# Patient Record
Sex: MALE | Race: WHITE | URBAN - METROPOLITAN AREA
[De-identification: names, ages, dates, MRNs, and addresses within clinical notes are randomized per-mention and may not be internally consistent; named-entity substitution may affect disease eponyms.]

---

## 2017-05-15 ENCOUNTER — THERAPY VISIT (OUTPATIENT)
Dept: PHYSICAL THERAPY | Facility: CLINIC | Age: 77
End: 2017-05-15

## 2017-05-15 DIAGNOSIS — M25.551 RIGHT HIP PAIN: ICD-10-CM

## 2017-05-15 DIAGNOSIS — M25.561 RIGHT KNEE PAIN: ICD-10-CM

## 2017-05-15 DIAGNOSIS — M54.50 LUMBAGO: Primary | ICD-10-CM

## 2017-05-15 PROCEDURE — 97161 PT EVAL LOW COMPLEX 20 MIN: CPT | Mod: GP | Performed by: PHYSICAL THERAPIST

## 2017-05-15 PROCEDURE — 97110 THERAPEUTIC EXERCISES: CPT | Mod: GP | Performed by: PHYSICAL THERAPIST

## 2017-05-15 NOTE — MR AVS SNAPSHOT
"              After Visit Summary   5/15/2017    Dawson Oshea    MRN: [id number]           Patient Information     Date Of Birth          1940        Visit Information        Provider Department      5/15/2017 8:00 AM Ricci Donaldson, [facility] Athletic [facility] Physical [facility]        Today's Diagnoses     Lumbago    -  1    Right hip pain        Right knee pain           Follow-ups after your visit        Your next 10 appointments already scheduled     May 19, 2017 11:00 AM CDT   [facility] Spine with Ricci Donaldson PT   [facility] ([place]  )    [address]  [place] [address] [phone]              Who to contact     If you have questions or need follow up information about today's clinic visit or your schedule please contact [facility] directly at [phone].  Normal or non-critical lab and imaging results will be communicated to you by Picklifyhart, letter or phone within 4 business days after the clinic has received the results. If you do not hear from us within 7 days, please contact the clinic through Picklifyhart or phone. If you have a critical or abnormal lab result, we will notify you by phone as soon as possible.  Submit refill requests through Jiff or call your pharmacy and they will forward the refill request to us. Please allow 3 business days for your refill to be completed.          Additional Information About Your Visit        Picklifyhart Information     Jiff lets you send messages to your doctor, view your test results, renew your prescriptions, schedule appointments and more. To sign up, go to [email or web address] . Click on \"Log in\" on the left side of the screen, which will take you to the Welcome page. Then click on \"Sign up Now\" on the right side of the page.     You will be asked to enter the access code listed below, " as well as some personal information. Please follow the directions to create your username and password.     Your access code is: [email or web address]  Expires: 2017  6:12 PM     Your access code will  in 90 days. If you need help or a new code, please call your [place] clinic or [phone].        Care EveryWhere ID     This is your Care EveryWhere ID. This could be used by other organizations to access your [place] medical records  [id number]         Blood Pressure from Last 3 Encounters:   05/05/10 156/75    Weight from Last 3 Encounters:   05/05/10 100.7 kg (222 lb)              We Performed the Following     HC PT EVAL, LOW COMPLEXITY     [facility] INITIAL EVAL REPORT     THERAPEUTIC EXERCISES        Primary Care Provider    None Specified       No primary provider on file.        Thank you!     Thank you for choosing [place] [facility] PHYSICAL THERAPY  for your care. Our goal is always to provide you with excellent care. Hearing back from our patients is one way we can continue to improve our services. Please take a few minutes to complete the written survey that you may receive in the mail after your visit with us. Thank you!             Your Updated Medication List - Protect others around you: Learn how to safely use, store and throw away your medicines at www.disposemymeds.org.          This list is accurate as of: 5/15/17 11:59 PM.  Always use your most recent med list.                   Brand Name Dispense Instructions for use    ALLEGRA 180 MG tablet   Generic drug:  fexofenadine      1 TABLET DAILY       aspirin 81 MG EC tablet      1 TABLET DAILY       CADUET 5-40 MG Tabs   Generic drug:  amLODIPine-atorvastatin      1 TABLET DAILY       celeBREX 100 MG capsule   Generic drug:  celecoxib      1 CAPSULE TWICE DAILY       colchicine 0.6 MG tablet     30 Tab    TAKE 2 TABLETS NOW, MAY REPEAT WITH ONE TABLET 1 HOUR LATER, THEN TAKE ONE TABLET DAILY FOR GOUT PREVENTION        doxycycline 50 MG capsule    VIBRAMYCIN     1 CAPSULE EVERY 12 HOURS       FLEXERIL 5 MG tablet   Generic drug:  cyclobenzaprine      PRN       FLOMAX 0.4 MG 24 hr capsule   Generic drug:  tamsulosin      1 CAPSULE DAILY       gabapentin 800 MG tablet    NEURONTIN     1 TABLET 3 TIMES DAILY       lisinopril 20 MG tablet    PRINIVIL/ZESTRIL     1 TABLET DAILY       LORazepam 0.5 MG tablet    ATIVAN     prn       metFORMIN modified 1000 MG 24 hr tablet    GLUMETZA     1 TABLET DAILY       nitroglycerin 0.4 MG sublingual tablet    NITROSTAT     PRN       order for DME      None Entered       PLAVIX 75 MG tablet   Generic drug:  clopidogrel      1 TABLET DAILY       STRATTERA 100 MG capsule   Generic drug:  atomoxetine      1 CAPSULE EVERY MORNING       torsemide 20 MG tablet    DEMADEX     1 TABLET EVERY MORNING       VIAGRA 50 MG cap/tab   Generic drug:  sildenafil      prn       VICODIN 5-500 MG per tablet   Generic drug:  HYDROcodone-acetaminophen     6 Tab    ONE TO TWO TABLETS EVERY 4 TO 6 HOURS AS NEEDED FOR PAIN

## 2017-05-15 NOTE — PROGRESS NOTES
Subjective:    Patient is a 76 year old male presenting with rehab back hpi.   Dawson Oshea is a 76 year old male with a lumbar condition.  Condition occurred with:  Insidious onset and degenerative joint disease.  Condition occurred: for unknown reasons.  This is a chronic condition  Pt presents to PT with complaints of right low back pain and pain into the right buttock/hip as well as pain in the front of the knee and calf.  He originally injured his back with some lifting in the 1960s.  He notes increased pain with washing dishes and walking.  He has attempted to do some biking and noticed some pain.  He also notes some pain with sitting as well.  He was diagnosed with scoliosis.  He had a cortisone injection that gave 1 week of relief.  He is also using extra strength tylenol during the day and also uses vicodin and a muscle relaxer at night.  .    Patient reports pain:  Lumbar spine right.  Radiates to:  Gluteals right and knee right.  Pain is described as aching and is constant and reported as 4/10.  Associated symptoms:  Loss of balance, loss of motion/stiffness and loss of strength. Pain is the same all the time.  Symptoms are exacerbated by bending, lifting, sitting, walking and standing and relieved by NSAID's, analgesics and muscle relaxants.  Since onset symptoms are gradually worsening.        General health as reported by patient is fair.  Pertinent medical history includes:  Overweight, osteoarthritis, diabetes, high blood pressure, heart problems, implanted device, thyroid problems, smoking and sleep disorder/apnea.  Medical allergies: yes (shell fish).  Other surgeries include:  Orthopedic surgery and other (bariatric, kidney stones).  Current medications:  Cardiac medication, thyroid medication, pain medication, muscle relaxants, steroids, anti-depressants and high blood pressure medication.  Current occupation is Retired.        Barriers include:  None as reported by the patient.    Red  flags:  None as reported by the patient.                        Objective:    System         Lumbar/SI Evaluation  ROM:    AROM Lumbar:   Flexion:          90%  Ext:                    50% (+)   Side Bend:        Left:  50% (+)    Right:  50% (+)  Rotation:           Left:     Right:   Side Glide:        Left:     Right:           Lumbar Myotomes:  normal            Lumbar DTR's:  not assessed      Cord Signs:  not assessed    Lumbar Dermtomes:  normal                        Spinal Segmental Conclusions: Pt demonstrated improved ROM and reported a mild improvement in pain following repeated flexion in sitting.                                                       General     ROS    Assessment/Plan:      Patient is a 76 year old male with lumbar complaints.    Patient has the following significant findings with corresponding treatment plan.                Diagnosis 1:  LBP   Pain -  hot/cold therapy, US, electric stimulation, mechanical traction, manual therapy, self management, education, directional preference exercise and home program  Decreased ROM/flexibility - manual therapy and therapeutic exercise  Decreased joint mobility - manual therapy and therapeutic exercise  Decreased strength - therapeutic exercise and therapeutic activities  Decreased function - therapeutic activities    Therapy Evaluation Codes:   1) History comprised of:   Personal factors that impact the plan of care:      None.    Comorbidity factors that impact the plan of care are:      None.     Medications impacting care: None.  2) Examination of Body Systems comprised of:   Body structures and functions that impact the plan of care:      Hip, Knee and Lumbar spine.   Activity limitations that impact the plan of care are:      Dressing, Lifting, Reading/Computer work, Sitting, Standing, Walking and Sleeping.  3) Clinical presentation characteristics are:   Stable/Uncomplicated.  4) Decision-Making    Low complexity using standardized patient  assessment instrument and/or measureable assessment of functional outcome.  Cumulative Therapy Evaluation is: Low complexity.    Previous and current functional limitations:  (See Goal Flow Sheet for this information)    Short term and Long term goals: (See Goal Flow Sheet for this information)     Communication ability:  Patient appears to be able to clearly communicate and understand verbal and written communication and follow directions correctly.  Treatment Explanation - The following has been discussed with the patient:   RX ordered/plan of care  Anticipated outcomes  Possible risks and side effects  This patient would benefit from PT intervention to resume normal activities.   Rehab potential is good.    Frequency:  2 X week, once daily  Duration:  for 4 weeks  Discharge Plan:  Achieve all LTG.  Independent in home treatment program.  Reach maximal therapeutic benefit.    Please refer to the daily flowsheet for treatment today, total treatment time and time spent performing 1:1 timed codes.

## 2017-05-15 NOTE — LETTER
[facility]  [address]  [place] [address]  [phone]    May 17, 2017    Re: Dawson Oshea   :   1940  MRN:  [id number]   REFERRING PHYSICIAN:   Courtney Palafox    [facility]    Date of Initial Evaluation:  05/15/2017  Visits:  Rxs Used: 1  Reason for Referral:     Lumbago  Right hip pain  Right knee pain    EVALUATION SUMMARY    Subjective:    Patient is a 76 year old male presenting with rehab back hpi.   Dawson Oshea is a 76 year old male with a lumbar condition.  Condition occurred with:  Insidious onset and degenerative joint disease.  Condition occurred: for unknown reasons.  This is a chronic condition  Pt presents to PT with complaints of right low back pain and pain into the right buttock/hip as well as pain in the front of the knee and calf.  He originally injured his back with some lifting in the 1960s.  He notes increased pain with washing dishes and walking.  He has attempted to do some biking and noticed some pain.  He also notes some pain with sitting as well.  He was diagnosed with scoliosis.  He had a cortisone injection that gave 1 week of relief.  He is also using extra strength tylenol during the day and also uses vicodin and a muscle relaxer at night.  .    Patient reports pain:  Lumbar spine right.  Radiates to:  Gluteals right and knee right.  Pain is described as aching and is constant and reported as 4/10.  Associated symptoms:  Loss of balance, loss of motion/stiffness and loss of strength. Pain is the same all the time.  Symptoms are exacerbated by bending, lifting, sitting, walking and standing and relieved by NSAID's, analgesics and muscle relaxants.  Since onset symptoms are gradually worsening.        General health as reported by patient is fair.  Pertinent medical history includes:  Overweight, osteoarthritis, diabetes, high blood  pressure, heart problems, implanted device, thyroid problems, smoking and sleep disorder/apnea.  Medical allergies: yes (shell fish).  Other surgeries include:  Orthopedic surgery and other (bariatric, kidney stones).  Current medications:  Cardiac medication, thyroid medication, pain medication, muscle relaxants, steroids, anti-depressants and high blood pressure medication.  Current occupation is Retired.      Barriers include:  None as reported by the patient.  Red flags:  None as reported by the patient.      Lumbar/SI Evaluation  ROM:    AROM Lumbar:   Flexion:          90%  Ext:                    50% (+)   Side Bend:        Left:  50% (+)    Right:  50% (+)  Rotation:           Left:     Right:   Side Glide:        Left:     Right:   Lumbar Myotomes:  normal  Lumbar DTR's:  not assessed  Cord Signs:  not assessed  Lumbar Dermtomes:  normal  Spinal Segmental Conclusions: Pt demonstrated improved ROM and reported a mild improvement in pain following repeated flexion in sitting.    Assessment/Plan:    Patient is a 76 year old male with lumbar complaints.    Patient has the following significant findings with corresponding treatment plan.                Diagnosis 1:  LBP   Pain -  hot/cold therapy, US, electric stimulation, mechanical traction, manual therapy, self management, education, directional preference exercise and home program  Decreased ROM/flexibility - manual therapy and therapeutic exercise  Decreased joint mobility - manual therapy and therapeutic exercise  Decreased strength - therapeutic exercise and therapeutic activities  Decreased function - therapeutic activities  Therapy Evaluation Codes:   1) History comprised of:   Personal factors that impact the plan of care:      None.    Comorbidity factors that impact the plan of care are:      None.     Medications impacting care: None.  2) Examination of Body Systems comprised of:   Body structures and functions that impact the plan of care:       Hip, Knee and Lumbar spine.   Activity limitations that impact the plan of care are:      Dressing, Lifting, Reading/Computer work, Sitting, Standing, Walking and Sleeping.  3) Clinical presentation characteristics are:   Stable/Uncomplicated.  4) Decision-Making    Low complexity using standardized patient assessment instrument and/or measureable assessment of functional outcome.  Cumulative Therapy Evaluation is: Low complexity.    Previous and current functional limitations:  (See Goal Flow Sheet for this information)    Short term and Long term goals: (See Goal Flow Sheet for this information)         Communication ability:  Patient appears to be able to clearly communicate and understand verbal and written communication and follow directions correctly.  Treatment Explanation - The following has been discussed with the patient:   RX ordered/plan of care  Anticipated outcomes  Possible risks and side effects  This patient would benefit from PT intervention to resume normal activities.   Rehab potential is good.    Frequency:  2 X week, once daily  Duration:  for 4 weeks  Discharge Plan:  Achieve all LTG.  Independent in home treatment program.  Reach maximal therapeutic benefit.              Thank you for your referral.    INQUIRIES  Therapist: Ricci Donaldson, [facility]  [facility] PHYSICAL THERAPY  [address]  [place] [address]  Phone: [phone]  Fax: [phone]

## 2017-05-16 PROBLEM — M25.551 RIGHT HIP PAIN: Status: ACTIVE | Noted: 2017-05-16

## 2017-05-16 PROBLEM — M54.50 LUMBAGO: Status: ACTIVE | Noted: 2017-05-16

## 2017-05-16 PROBLEM — M25.561 RIGHT KNEE PAIN: Status: ACTIVE | Noted: 2017-05-16

## 2017-05-19 ENCOUNTER — THERAPY VISIT (OUTPATIENT)
Dept: PHYSICAL THERAPY | Facility: CLINIC | Age: 77
End: 2017-05-19

## 2017-05-19 DIAGNOSIS — M54.50 CHRONIC BILATERAL LOW BACK PAIN WITHOUT SCIATICA: ICD-10-CM

## 2017-05-19 DIAGNOSIS — G89.29 CHRONIC BILATERAL LOW BACK PAIN WITHOUT SCIATICA: ICD-10-CM

## 2017-05-19 DIAGNOSIS — M25.561 CHRONIC PAIN OF RIGHT KNEE: ICD-10-CM

## 2017-05-19 DIAGNOSIS — G89.29 CHRONIC PAIN OF RIGHT KNEE: ICD-10-CM

## 2017-05-19 DIAGNOSIS — M25.551 RIGHT HIP PAIN: ICD-10-CM

## 2017-05-19 PROCEDURE — 97112 NEUROMUSCULAR REEDUCATION: CPT | Mod: GP | Performed by: PHYSICAL THERAPIST

## 2017-05-19 PROCEDURE — 97110 THERAPEUTIC EXERCISES: CPT | Mod: GP | Performed by: PHYSICAL THERAPIST

## 2017-06-06 ENCOUNTER — THERAPY VISIT (OUTPATIENT)
Dept: PHYSICAL THERAPY | Facility: CLINIC | Age: 77
End: 2017-06-06

## 2017-06-06 DIAGNOSIS — M25.561 CHRONIC PAIN OF RIGHT KNEE: ICD-10-CM

## 2017-06-06 DIAGNOSIS — M25.551 RIGHT HIP PAIN: ICD-10-CM

## 2017-06-06 DIAGNOSIS — G89.29 CHRONIC PAIN OF RIGHT KNEE: ICD-10-CM

## 2017-06-06 DIAGNOSIS — M54.50 CHRONIC BILATERAL LOW BACK PAIN WITHOUT SCIATICA: ICD-10-CM

## 2017-06-06 DIAGNOSIS — G89.29 CHRONIC BILATERAL LOW BACK PAIN WITHOUT SCIATICA: ICD-10-CM

## 2017-06-06 PROCEDURE — 97112 NEUROMUSCULAR REEDUCATION: CPT | Mod: GP | Performed by: PHYSICAL THERAPIST

## 2017-06-06 PROCEDURE — 97110 THERAPEUTIC EXERCISES: CPT | Mod: GP | Performed by: PHYSICAL THERAPIST

## 2017-06-06 NOTE — MR AVS SNAPSHOT
"              After Visit Summary   6/6/2017    Dawson Oshea    MRN: [id number]           Patient Information     Date Of Birth          1940        Visit Information        Provider Department      6/6/2017 10:00 AM Ricci Donaldson, PT [facility] Athletic [facility] Physical [facility]        Today's Diagnoses     Chronic bilateral low back pain without sciatica        Right hip pain        Chronic pain of right knee           Follow-ups after your visit        Your next 10 appointments already scheduled     Jun 13, 2017  1:50 PM CDT   [place] Spine with Ricci Donaldson PT   [facility] ([facility]  )    [address]  [place] [address] [phone]              Who to contact     If you have questions or need follow up information about today's clinic visit or your schedule please contact [facility] PHYSICAL [facility] directly at [phone].  Normal or non-critical lab and imaging results will be communicated to you by FTAPI Softwarehart, letter or phone within 4 business days after the clinic has received the results. If you do not hear from us within 7 days, please contact the clinic through Giggemt or phone. If you have a critical or abnormal lab result, we will notify you by phone as soon as possible.  Submit refill requests through Rigel or call your pharmacy and they will forward the refill request to us. Please allow 3 business days for your refill to be completed.          Additional Information About Your Visit        FTAPI Softwarehart Information     Rigel lets you send messages to your doctor, view your test results, renew your prescriptions, schedule appointments and more. To sign up, go to [email or web address] . Click on \"Log in\" on the left side of the screen, which will take you to the Welcome page. Then click on \"Sign up Now\" on the right side of the page.     You will " be asked to enter the access code listed below, as well as some personal information. Please follow the directions to create your username and password.     Your access code is: [email or web address]  Expires: 2017  6:12 PM     Your access code will  in 90 days. If you need help or a new code, please call your [place] clinic or [phone].        Care EveryWhere ID     This is your Care EveryWhere ID. This could be used by other organizations to access your [place] medical records  [id number]         Blood Pressure from Last 3 Encounters:   05/05/10 156/75    Weight from Last 3 Encounters:   05/05/10 100.7 kg (222 lb)              We Performed the Following     NEUROMUSCULAR RE-EDUCATION     THERAPEUTIC EXERCISES        Primary Care Provider    None Specified       No primary provider on file.        Thank you!     Thank you for choosing [place] [facility] PHYSICAL THERAPY  for your care. Our goal is always to provide you with excellent care. Hearing back from our patients is one way we can continue to improve our services. Please take a few minutes to complete the written survey that you may receive in the mail after your visit with us. Thank you!             Your Updated Medication List - Protect others around you: Learn how to safely use, store and throw away your medicines at www.disposemymeds.org.          This list is accurate as of: 17 11:59 PM.  Always use your most recent med list.                   Brand Name Dispense Instructions for use    ALLEGRA 180 MG tablet   Generic drug:  fexofenadine      1 TABLET DAILY       aspirin 81 MG EC tablet      1 TABLET DAILY       CADUET 5-40 MG Tabs   Generic drug:  amLODIPine-atorvastatin      1 TABLET DAILY       celeBREX 100 MG capsule   Generic drug:  celecoxib      1 CAPSULE TWICE DAILY       colchicine 0.6 MG tablet     30 Tab    TAKE 2 TABLETS NOW, MAY REPEAT WITH ONE TABLET 1 HOUR LATER, THEN TAKE ONE TABLET DAILY FOR  GOUT PREVENTION       doxycycline 50 MG capsule    VIBRAMYCIN     1 CAPSULE EVERY 12 HOURS       FLEXERIL 5 MG tablet   Generic drug:  cyclobenzaprine      PRN       FLOMAX 0.4 MG 24 hr capsule   Generic drug:  tamsulosin      1 CAPSULE DAILY       gabapentin 800 MG tablet    NEURONTIN     1 TABLET 3 TIMES DAILY       lisinopril 20 MG tablet    PRINIVIL/ZESTRIL     1 TABLET DAILY       LORazepam 0.5 MG tablet    ATIVAN     prn       metFORMIN modified 1000 MG 24 hr tablet    GLUMETZA     1 TABLET DAILY       nitroglycerin 0.4 MG sublingual tablet    NITROSTAT     PRN       order for DME      None Entered       PLAVIX 75 MG tablet   Generic drug:  clopidogrel      1 TABLET DAILY       STRATTERA 100 MG capsule   Generic drug:  atomoxetine      1 CAPSULE EVERY MORNING       torsemide 20 MG tablet    DEMADEX     1 TABLET EVERY MORNING       VIAGRA 50 MG cap/tab   Generic drug:  sildenafil      prn       VICODIN 5-500 MG per tablet   Generic drug:  HYDROcodone-acetaminophen     6 Tab    ONE TO TWO TABLETS EVERY 4 TO 6 HOURS AS NEEDED FOR PAIN

## 2017-06-07 NOTE — PROGRESS NOTES
Subjective:    HPI                    Objective:    System    Physical Exam    General     ROS    Assessment/Plan:      SUBJECTIVE  Subjective changes as noted by pt:  Pt reports that he has not been consistent with exercise so far and feels that is why his progress has been limited.  He has been doing calf stretching and has noticed less cramping lately.     Current pain level:  Current Pain level: 3/10   Changes in function:  None     Adverse reaction to treatment or activity:  None    OBJECTIVE  Changes in objective findings:  Pt demonstrated good technique and reported good tolerance to LE strengthening.  He had some knee pain with soleus stretching so will focus on gastroc only.  Knee bends were also painful in his right knee.  SLR's were better tolerated.        ASSESSMENT  Dawson continues to require intervention to meet STG and LTG's: PT  Patient is progressing as expected.  Progress made towards STG/LTG?  None    PLAN  Continue current treatment plan until patient demonstrates readiness to progress to higher level exercises.    PTA/ATC plan:  N/A    Please refer to the daily flowsheet for treatment today, total treatment time and time spent performing 1:1 timed codes.

## 2017-06-13 ENCOUNTER — THERAPY VISIT (OUTPATIENT)
Dept: PHYSICAL THERAPY | Facility: CLINIC | Age: 77
End: 2017-06-13
Payer: COMMERCIAL

## 2017-06-13 DIAGNOSIS — M25.561 CHRONIC PAIN OF RIGHT KNEE: ICD-10-CM

## 2017-06-13 DIAGNOSIS — M54.50 CHRONIC BILATERAL LOW BACK PAIN WITHOUT SCIATICA: ICD-10-CM

## 2017-06-13 DIAGNOSIS — G89.29 CHRONIC PAIN OF RIGHT KNEE: ICD-10-CM

## 2017-06-13 DIAGNOSIS — M25.551 RIGHT HIP PAIN: ICD-10-CM

## 2017-06-13 DIAGNOSIS — G89.29 CHRONIC BILATERAL LOW BACK PAIN WITHOUT SCIATICA: ICD-10-CM

## 2017-06-13 PROCEDURE — 97110 THERAPEUTIC EXERCISES: CPT | Mod: GP | Performed by: PHYSICAL THERAPIST

## 2017-06-13 PROCEDURE — 97112 NEUROMUSCULAR REEDUCATION: CPT | Mod: GP | Performed by: PHYSICAL THERAPIST

## 2017-06-13 NOTE — PROGRESS NOTES
Subjective:    HPI                    Objective:    System    Physical Exam    General     ROS    Assessment/Plan:      SUBJECTIVE  Subjective changes as noted by pt:  [facility] reports [facility]  He reports that the his knee is feeling better from it.  He notes that he could walk all the way to [facility] without [facility].     Current pain level:  Current Pain level: 1/10   Changes in function:  Yes (See Goal flowsheet attached for changes in current functional level)     Adverse reaction to treatment or activity:  None    OBJECTIVE  Changes in objective findings:  Lumbar AROM:  Flexion 90%, Extension 75%, B side bending 50%.  Pt demonstrated good tolerance to knee bends today which had given him issues previously.          ASSESSMENT  Dawson continues to require intervention to meet STG and LTG's: PT  Patient is progressing as expected.  Progress made towards STG/LTG?  Yes (See Goal flowsheet attached for updates on achievement of STG and LTG)    PLAN  Current treatment program is being advanced to more complex exercises.    PTA/ATC plan:  N/A    Please refer to the daily flowsheet for treatment today, total treatment time and time spent performing 1:1 timed codes.

## 2017-06-13 NOTE — MR AVS SNAPSHOT
"              After Visit Summary   6/13/2017    Dawson Oshea    MRN: [id number]           Patient Information     Date Of Birth          1940        Visit Information        Provider Department      6/13/2017 1:50 PM Ricci Donaldson, NOEMY [facility] Athletic [facility] Physical [facility]        Today's Diagnoses     Chronic bilateral low back pain without sciatica        Right hip pain        Chronic pain of right knee           Follow-ups after your visit        Your next 10 appointments already scheduled     Jun 20, 2017  1:50 PM CDT   [facility]AGHAN Spine with Ricci Donaldson PT   [facility] ([facility]  )    [address]  [place] [address] [phone]              Who to contact     If you have questions or need follow up information about today's clinic visit or your schedule please contact [facility] directly at [phone].  Normal or non-critical lab and imaging results will be communicated to you by Cahootifyhart, letter or phone within 4 business days after the clinic has received the results. If you do not hear from us within 7 days, please contact the clinic through [employer] (IMMI)t or phone. If you have a critical or abnormal lab result, we will notify you by phone as soon as possible.  Submit refill requests through Everpurse or call your pharmacy and they will forward the refill request to us. Please allow 3 business days for your refill to be completed.          Additional Information About Your Visit        Cahootifyhart Information     Everpurse lets you send messages to your doctor, view your test results, renew your prescriptions, schedule appointments and more. To sign up, go to [email or web address] . Click on \"Log in\" on the left side of the screen, which will take you to the Welcome page. Then click on \"Sign up Now\" on the right side of the page.     You will " be asked to enter the access code listed below, as well as some personal information. Please follow the directions to create your username and password.     Your access code is: [email or web address]  Expires: 2017  6:12 PM     Your access code will  in 90 days. If you need help or a new code, please call your [place] clinic or [phone].        Care EveryWhere ID     This is your Care EveryWhere ID. This could be used by other organizations to access your [place] medical records  [id number]         Blood Pressure from Last 3 Encounters:   05/05/10 156/75    Weight from Last 3 Encounters:   05/05/10 100.7 kg (222 lb)              We Performed the Following     NEUROMUSCULAR RE-EDUCATION     THERAPEUTIC EXERCISES        Primary Care Provider    None Specified       No primary provider on file.        Thank you!     Thank you for choosing [place] [facility] PH[facility]ICAL THERAP[facility]  for your care. Our goal is always to provide you with excellent care. Hearing back from our patients is one way we can continue to improve our services. Please take a few minutes to complete the written survey that you may receive in the mail after your visit with us. Thank you!             Your Updated Medication List - Protect others around you: Learn how to safely use, store and throw away your medicines at www.disposemymeds.org.          This list is accurate as of: 17  5:12 PM.  Always use your most recent med list.                   Brand Name Dispense Instructions for use    ALLEGRA 180 MG tablet   Generic drug:  fexofenadine      1 TABLET DAILY       aspirin 81 MG EC tablet      1 TABLET DAILY       CADUET 5-40 MG Tabs   Generic drug:  amLODIPine-atorvastatin      1 TABLET DAILY       celeBREX 100 MG capsule   Generic drug:  celecoxib      1 CAPSULE TWICE DAILY       colchicine 0.6 MG tablet     30 Tab    TAKE 2 TABLETS NOW, MAY REPEAT WITH ONE TABLET 1 HOUR LATER, THEN TAKE ONE TABLET DAILY FOR  GOUT PREVENTION       doxycycline 50 MG capsule    VIBRAMYCIN     1 CAPSULE EVERY 12 HOURS       FLEXERIL 5 MG tablet   Generic drug:  cyclobenzaprine      PRN       FLOMAX 0.4 MG 24 hr capsule   Generic drug:  tamsulosin      1 CAPSULE DAILY       gabapentin 800 MG tablet    NEURONTIN     1 TABLET 3 TIMES DAILY       lisinopril 20 MG tablet    PRINIVIL/ZESTRIL     1 TABLET DAILY       LORazepam 0.5 MG tablet    ATIVAN     prn       metFORMIN modified 1000 MG 24 hr tablet    GLUMETZA     1 TABLET DAILY       nitroglycerin 0.4 MG sublingual tablet    NITROSTAT     PRN       order for DME      None Entered       PLAVIX 75 MG tablet   Generic drug:  clopidogrel      1 TABLET DAILY       STRATTERA 100 MG capsule   Generic drug:  atomoxetine      1 CAPSULE EVERY MORNING       torsemide 20 MG tablet    DEMADEX     1 TABLET EVERY MORNING       VIAGRA 50 MG cap/tab   Generic drug:  sildenafil      prn       VICODIN 5-500 MG per tablet   Generic drug:  HYDROcodone-acetaminophen     6 Tab    ONE TO TWO TABLETS EVERY 4 TO 6 HOURS AS NEEDED FOR PAIN

## 2017-06-20 ENCOUNTER — THERAPY VISIT (OUTPATIENT)
Dept: PHYSICAL THERAPY | Facility: CLINIC | Age: 77
End: 2017-06-20

## 2017-06-20 DIAGNOSIS — M54.50 CHRONIC BILATERAL LOW BACK PAIN WITHOUT SCIATICA: ICD-10-CM

## 2017-06-20 DIAGNOSIS — G89.29 CHRONIC BILATERAL LOW BACK PAIN WITHOUT SCIATICA: ICD-10-CM

## 2017-06-20 DIAGNOSIS — G89.29 CHRONIC PAIN OF RIGHT KNEE: ICD-10-CM

## 2017-06-20 DIAGNOSIS — M25.561 CHRONIC PAIN OF RIGHT KNEE: ICD-10-CM

## 2017-06-20 DIAGNOSIS — M25.551 RIGHT HIP PAIN: ICD-10-CM

## 2017-06-20 PROCEDURE — 97110 THERAPEUTIC EXERCISES: CPT | Mod: GP | Performed by: PHYSICAL THERAPIST

## 2017-06-20 PROCEDURE — 97112 NEUROMUSCULAR REEDUCATION: CPT | Mod: GP | Performed by: PHYSICAL THERAPIST

## 2017-06-20 NOTE — PROGRESS NOTES
Subjective:    HPI                    Objective:    System    Physical Exam    General     ROS    Assessment/Plan:      SUBJECTIVE  Subjective changes as noted by pt:  Pt reports that he feels his legs are improving.  Low back pain is mild at this point.     Current pain level:  Current Pain level: 1/10   Changes in function:  Yes (See Goal flowsheet attached for changes in current functional level)     Adverse reaction to treatment or activity:  None    OBJECTIVE  Changes in objective findings:  Pt demonstrated good technique with exercises.  Reps increasing with improved endurance.        ASSESSMENT  Dawson continues to require intervention to meet STG and LTG's: PT  Patient is progressing as expected.  Progress made towards STG/LTG?  Yes (See Goal flowsheet attached for updates on achievement of STG and LTG)    PLAN  Continue current treatment plan until patient demonstrates readiness to progress to higher level exercises.    PTA/ATC plan:  N/A    Please refer to the daily flowsheet for treatment today, total treatment time and time spent performing 1:1 timed codes.

## 2017-06-20 NOTE — MR AVS SNAPSHOT
"              After Visit Summary   6/20/2017    Dawson Oshea    MRN: [id number]           Patient Information     Date Of Birth          1940        Visit Information        Provider Department      6/20/2017 1:50 PM Ricci Donaldson, NOEMY [facility] Athletic [facility] Physical [facility]        Today's Diagnoses     Chronic bilateral low back pain without sciatica        Right hip pain        Chronic pain of right knee           Follow-ups after your visit        Your next 10 appointments already scheduled     Jul 03, 2017  1:50 PM CDT   ME[facility] Spine with Ricci Donaldson PT   [facility]    [address]  [place] [address] [phone]              Who to contact     If you have questions or need follow up information about today's clinic visit or your schedule please contact [facility]YSICAL [facility] directly at [phone].  Normal or non-critical lab and imaging results will be communicated to you by Zendeskhart, letter or phone within 4 business days after the clinic has received the results. If you do not hear from us within 7 days, please contact the clinic through Appsidet or phone. If you have a critical or abnormal lab result, we will notify you by phone as soon as possible.  Submit refill requests through Segopotso or call your pharmacy and they will forward the refill request to us. Please allow 3 business days for your refill to be completed.          Additional Information About Your Visit        Zendeskhart Information     Segopotso lets you send messages to your doctor, view your test results, renew your prescriptions, schedule appointments and more. To sign up, go to [email or web address] . Click on \"Log in\" on the left side of the screen, which will take you to the Welcome page. Then click on \"Sign up Now\" on the right side of the page.     You will " be asked to enter the access code listed below, as well as some personal information. Please follow the directions to create your username and password.     Your access code is: [email or web address]  Expires: 2017  6:12 PM     Your access code will  in 90 days. If you need help or a new code, please call your [place] clinic or [phone].        Care EveryWhere ID     This is your Care EveryWhere ID. This could be used by other organizations to access your [place] medical records  [id number]         Blood Pressure from Last 3 Encounters:   05/05/10 156/75    Weight from Last 3 Encounters:   05/05/10 100.7 kg (222 lb)              We Performed the Following     NEUROMUSCULAR RE-EDUCATION     THERAPEUTIC EXERCISES        Primary Care Provider    None Specified       No primary provider on file.        Thank you!     Thank you for choosing [place] [facility] PHYSICAL THERAPY  for your care. Our goal is always to provide you with excellent care. Hearing back from our patients is one way we can continue to improve our services. Please take a few minutes to complete the written survey that you may receive in the mail after your visit with us. Thank you!             Your Updated Medication List - Protect others around you: Learn how to safely use, store and throw away your medicines at www.disposemymeds.org.          This list is accurate as of: 17  5:47 PM.  Always use your most recent med list.                   Brand Name Dispense Instructions for use    ALLEGRA 180 MG tablet   Generic drug:  fexofenadine      1 TABLET DAILY       aspirin 81 MG EC tablet      1 TABLET DAILY       CADUET 5-40 MG Tabs   Generic drug:  amLODIPine-atorvastatin      1 TABLET DAILY       celeBREX 100 MG capsule   Generic drug:  celecoxib      1 CAPSULE TWICE DAILY       colchicine 0.6 MG tablet     30 Tab    TAKE 2 TABLETS NOW, MAY REPEAT WITH ONE TABLET 1 HOUR LATER, THEN TAKE ONE TABLET DAILY FOR  GOUT PREVENTION       doxycycline 50 MG capsule    VIBRAMYCIN     1 CAPSULE EVERY 12 HOURS       FLEXERIL 5 MG tablet   Generic drug:  cyclobenzaprine      PRN       FLOMAX 0.4 MG 24 hr capsule   Generic drug:  tamsulosin      1 CAPSULE DAILY       gabapentin 800 MG tablet    NEURONTIN     1 TABLET 3 TIMES DAILY       lisinopril 20 MG tablet    PRINIVIL/ZESTRIL     1 TABLET DAILY       LORazepam 0.5 MG tablet    ATIVAN     prn       metFORMIN modified 1000 MG 24 hr tablet    GLUMETZA     1 TABLET DAILY       nitroglycerin 0.4 MG sublingual tablet    NITROSTAT     PRN       order for DME      None Entered       PLAVIX 75 MG tablet   Generic drug:  clopidogrel      1 TABLET DAILY       STRATTERA 100 MG capsule   Generic drug:  atomoxetine      1 CAPSULE EVERY MORNING       torsemide 20 MG tablet    DEMADEX     1 TABLET EVERY MORNING       VIAGRA 50 MG cap/tab   Generic drug:  sildenafil      prn       VICODIN 5-500 MG per tablet   Generic drug:  HYDROcodone-acetaminophen     6 Tab    ONE TO TWO TABLETS EVERY 4 TO 6 HOURS AS NEEDED FOR PAIN

## 2017-07-05 ENCOUNTER — THERAPY VISIT (OUTPATIENT)
Dept: PHYSICAL THERAPY | Facility: CLINIC | Age: 77
End: 2017-07-05
Payer: COMMERCIAL

## 2017-07-05 DIAGNOSIS — M54.50 CHRONIC BILATERAL LOW BACK PAIN WITHOUT SCIATICA: ICD-10-CM

## 2017-07-05 DIAGNOSIS — G89.29 CHRONIC PAIN OF RIGHT KNEE: ICD-10-CM

## 2017-07-05 DIAGNOSIS — M25.551 RIGHT HIP PAIN: ICD-10-CM

## 2017-07-05 DIAGNOSIS — G89.29 CHRONIC BILATERAL LOW BACK PAIN WITHOUT SCIATICA: ICD-10-CM

## 2017-07-05 DIAGNOSIS — M25.561 CHRONIC PAIN OF RIGHT KNEE: ICD-10-CM

## 2017-07-05 PROCEDURE — 97112 NEUROMUSCULAR REEDUCATION: CPT | Mod: GP | Performed by: PHYSICAL THERAPIST

## 2017-07-05 PROCEDURE — 97110 THERAPEUTIC EXERCISES: CPT | Mod: GP | Performed by: PHYSICAL THERAPIST

## 2017-07-05 NOTE — PROGRESS NOTES
Subjective:    HPI  Oswestry Score: 24.44 %                 Objective:    System    Physical Exam    General     ROS    Assessment/Plan:      PROGRESS  REPORT    Progress reporting period is from 5- to 7-5-2017.       SUBJECTIVE  Subjective changes noted by patient:  Pt reports that his low back pain is mild at this time.  [facility] for his knee and he has found that helpful.  He continues to notice left leg weakness and feels this affects his balance.  .       Current pain level is  Current Pain level: 1/10.     Previous pain level was   Initial Pain level: 4/10.   Changes in function:  Yes (See Goal flowsheet attached for changes in current functional level)  Adverse reaction to treatment or activity: None    OBJECTIVE  Changes noted in objective findings:  Pt continues to ambulate with a cane which is appropriate for his current status.  Pt is able to balance on 1 leg for up to 5-8 seconds.  He is performing a HEP that includes lumbar stretches as well as LE strengthening and proprioceptive training.  Pt was encouraged to work on being consistent with these exercises to improve his strength and balance.  We discussed body mechanics to protect his back as well.        ASSESSMENT/PLAN  Updated problem list and treatment plan: Diagnosis 1:  LBP    STG/LTGs have been met or progress has been made towards goals:  Yes (See Goal flow sheet completed today.)  Assessment of Progress: The patient's condition is improving.  Self Management Plans:  Patient has been instructed in a home treatment program.  Dawson continues to require the following intervention to meet STG and LTG's:  PT intervention is no longer required to meet STG/LTG.    Recommendations:  This patient is ready to be discharged from therapy and continue their home treatment program.    Please refer to the daily flowsheet for treatment today, total treatment time and time spent performing 1:1 timed  codes.

## 2017-07-05 NOTE — LETTER
[facility]  [address]  [place] [address]  [phone]    2017    Re: Dawson Oshea   :   1940  MRN:  [id number]   REFERRING PHYSICIAN:   Courtney Palafox    [facility]    Date of Initial Evaluation:  05/15/2017  Visits:  Rxs Used: 6  Reason for Referral:     Chronic bilateral low back pain without sciatica  Right hip pain  Chronic pain of right knee    EVALUATION SUMMARY             PROGRESS  REPORT  Progress reporting period is from 5- to 2017.       SUBJECTIVE  Subjective changes noted by patient:  Pt reports that his low back pain is mild at this time.  [facility] for his [facility] and he has found that helpful.  He continues to notice left leg weakness and feels this affects his balance.  .       Current pain level is  Current Pain level: 1/10.     Previous pain level was   Initial Pain level: 4/10.   Changes in function:  Yes (See Goal flowsheet attached for changes in current functional level)  Adverse reaction to treatment or activity: None  OBJECTIVE  Changes noted in objective findings:  Pt continues to ambulate with a cane which is appropriate for his current status.  Pt is able to balance on 1 leg for up to 5-8 seconds.  He is performing a HEP that includes lumbar stretches as well as LE strengthening and proprioceptive training.  Pt was encouraged to work on being consistent with these exercises to improve his strength and balance.  We discussed body mechanics to protect his back as well.  Oswestry Score: 24.44 %          ASSESSMENT/PLAN  Updated problem list and treatment plan: Diagnosis 1:  LBP    STG/LTGs have been met or progress has been made towards goals:  Yes (See Goal flow sheet completed today.)  Assessment of Progress: The patient's condition is improving.  Self Management Plans:   Patient has been instructed in a home treatment program.  Dawson continues to require the following intervention to meet STG and LTG's:  PT intervention is no longer required to meet STG/LTG.    Recommendations:  This patient is ready to be discharged from therapy and continue their home treatment program.                Thank you for your referral.    INQUIRIES  Therapist: Ricci Donaldson, [facility]  [facility]  [address]  [place] [address]  Phone: [phone]  Fax: [phone]

## 2017-07-05 NOTE — MR AVS SNAPSHOT
"              After Visit Summary   2017    Dawson Oshea    MRN: [id number]           Patient Information     Date Of Birth          1940        Visit Information        Provider Department      2017 10:10 AM Ricci Donaldson PT [facility] Athletic [facility] Physical [facility]        Today's Diagnoses     Chronic bilateral low back pain without sciatica        Right hip pain        Chronic pain of right knee           Follow-ups after your visit        Who to contact     If you have questions or need follow up information about today's clinic visit or your schedule please contact [facility] PHYSICAL [facility] directly at [phone].  Normal or non-critical lab and imaging results will be communicated to you by MyChart, letter or phone within 4 business days after the clinic has received the results. If you do not hear from us within 7 days, please contact the clinic through MyChart or phone. If you have a critical or abnormal lab result, we will notify you by phone as soon as possible.  Submit refill requests through [employer] or call your pharmacy and they will forward the refill request to us. Please allow 3 business days for your refill to be completed.          Additional Information About Your Visit        MyChart Information     [employer] lets you send messages to your doctor, view your test results, renew your prescriptions, schedule appointments and more. To sign up, go to [email or web address] . Click on \"Log in\" on the left side of the screen, which will take you to the Welcome page. Then click on \"Sign up Now\" on the right side of the page.     You will be asked to enter the access code listed below, as well as some personal information. Please follow the directions to create your username and password.     Your access code is: [email or web address]  Expires: 2017  6:12 PM     Your access code will  in 90 days. If you need help or a " new code, please call your [place] clinic or [phone].        Care EveryWhere ID     This is your Care EveryWhere ID. This could be used by other organizations to access your [place] medical records  [id number]         Blood Pressure from Last 3 Encounters:   05/05/10 156/75    Weight from Last 3 Encounters:   05/05/10 100.7 kg (222 lb)              We Performed the Following     [facility] PROGRESS NOTES REPORT     NEUROMUSCULAR RE-EDUCATION     THERAPEUTIC EXERCISES        Primary Care Provider    None Specified       No primary provider on file.        Equal Access to Services     ABDULKADIR [facility]: Hadii aad ku hadasho Soomaali, waaxda luqadaha, qaybta kaalmada adeegyada, waxay idiin hayaan mesha carter . So [facility] [phone].    ATENCIÓN: Si habla español, tiene a solis disposición servicios gratuitos de asistencia lingüística. Llame al [phone].    We comply with applicable federal civil rights laws and [place] laws. We do not discriminate on the basis of race, color, national origin, age, disability sex, sexual orientation or gender identity.            Thank you!     Thank you for choosing [facility] THERAP[facility]  for your care. Our goal is always to provide you with excellent care. Hearing back from our patients is one way we can continue to improve our services. Please take a few minutes to complete the written survey that you may receive in the mail after your visit with us. Thank you!             Your Updated Medication List - Protect others around you: Learn how to safely use, store and throw away your medicines at www.disposemymeds.org.          This list is accurate as of: 7/5/17 11:34 AM.  Always use your most recent med list.                   Brand Name Dispense Instructions for use Diagnosis    ALLEGRA 180 MG tablet   Generic drug:  fexofenadine      1 TABLET DAILY        aspirin 81 MG EC tablet      1 TABLET DAILY        CADUET 5-40 MG Tabs    Generic drug:  amLODIPine-atorvastatin      1 TABLET DAILY        celeBREX 100 MG capsule   Generic drug:  celecoxib      1 CAPSULE TWICE DAILY        colchicine 0.6 MG tablet     30 Tab    TAKE 2 TABLETS NOW, MAY REPEAT WITH ONE TABLET 1 HOUR LATER, THEN TAKE ONE TABLET DAILY FOR GOUT PREVENTION    Gout       doxycycline 50 MG capsule    VIBRAMYCIN     1 CAPSULE EVERY 12 HOURS        FLEXERIL 5 MG tablet   Generic drug:  cyclobenzaprine      PRN        FLOMAX 0.4 MG 24 hr capsule   Generic drug:  tamsulosin      1 CAPSULE DAILY        gabapentin 800 MG tablet    NEURONTIN     1 TABLET 3 TIMES DAILY        lisinopril 20 MG tablet    PRINIVIL/ZESTRIL     1 TABLET DAILY        LORazepam 0.5 MG tablet    ATIVAN     prn        metFORMIN modified 1000 MG 24 hr tablet    GLUMETZA     1 TABLET DAILY        nitroGLYcerin 0.4 MG sublingual tablet    NITROSTAT     PRN        order for DME      None Entered        PLAVIX 75 MG tablet   Generic drug:  clopidogrel      1 TABLET DAILY        STRATTERA 100 MG capsule   Generic drug:  atomoxetine      1 CAPSULE EVERY MORNING        torsemide 20 MG tablet    DEMADEX     1 TABLET EVERY MORNING        VIAGRA 50 MG cap/tab   Generic drug:  sildenafil      prn        VICODIN 5-500 MG per tablet   Generic drug:  HYDROcodone-acetaminophen     6 Tab    ONE TO TWO TABLETS EVERY 4 TO 6 HOURS AS NEEDED FOR PAIN    Injury, hands, Gout

## 2017-07-07 PROBLEM — M25.551 RIGHT HIP PAIN: Status: RESOLVED | Noted: 2017-05-16 | Resolved: 2017-07-07

## 2017-07-07 PROBLEM — M25.561 RIGHT KNEE PAIN: Status: RESOLVED | Noted: 2017-05-16 | Resolved: 2017-07-07

## 2017-07-07 PROBLEM — M54.50 LUMBAGO: Status: RESOLVED | Noted: 2017-05-16 | Resolved: 2017-07-07
